# Patient Record
Sex: FEMALE | Race: WHITE
[De-identification: names, ages, dates, MRNs, and addresses within clinical notes are randomized per-mention and may not be internally consistent; named-entity substitution may affect disease eponyms.]

---

## 2018-02-13 NOTE — EDM.PDOC
ED HPI GENERAL MEDICAL PROBLEM





- General


Chief Complaint: General


Stated Complaint: PT BLOOD PRESSURE HIGH


Time Seen by Provider: 18 22:59





- History of Present Illness


INITIAL COMMENTS - FREE TEXT/NARRATIVE: 





HISTORY AND PHYSICAL:





History of present illness:


The patient is a 77-year-old female who follows in our family practice clinic 

and actually has a scheduled appointment tomorrow with Dr. Morrow for discussion 

about her blood pressure and presents tonight saying that it increased more 

significantly and she is concerned. The patient says she's been under a lot of 

stress this year as her  was very ill and recently  in December. She 

has noted over the last few months that her blood pressures been on the higher 

side and in fact had her lisinopril doubled to 20 mg once a day in December. 

She has a follow-up appointment scheduled tomorrow and she says over the last 

week or so it has been running on the higher side with systolics in the 160s. 

This evening she checked it and it was 188 which made her very nervous and 

anxious and then she took it again on the other side and it was elevated to the 

200s so she came for evaluation. The patient has been eating and drinking 

normally and has not had increased sodium in her diet. She has no swelling in 

her legs no chest pain no shortness of breath abdominal pain or vomiting and no 

urinary complaints. She is not lightheaded or dizzy and has no headaches. She 

has no weakness or neurosensory changes in her extremities. She admits that she 

has been very anxious since the death of her  and she is anxious about 

her blood pressure.





Review of systems: 


As per history of present illness and below otherwise all systems reviewed and 

negative.





Past medical history: 


As per history of present illness and as reviewed below otherwise 

noncontributory.





Surgical history: 


As per history of present illness and as reviewed below otherwise 

noncontributory.





Social history: 


No reported history of drug or alcohol abuse.





Family history: 


As per history of present illness and as reviewed below otherwise 

noncontributory.





Physical exam:


Gen.: Well-developed well-nourished male who is nontoxic and moves easily in 

the ED. Her blood pressure of 235/117 as noted by me. Clearly and easily


HEENT: Atraumatic, normocephalic, pupils reactive, negative for conjunctival 

pallor or scleral icterus, mucous membranes moist, throat clear, neck supple, 

nontender, trachea midline.


Lungs: Clear to auscultation, breath sounds equal bilaterally, chest nontender.


Heart: S1S2, regular, negative for clicks, rubs, or JVD.


Abdomen: Soft, nondistended, nontender. Negative for masses or 

hepatosplenomegaly. NABS.


Pelvis: Stable nontender.


Genitourinary: Deferred.


Rectal: Deferred.


Extremities: Atraumatic, negative for cords or calf pain. Neurovascular 

unremarkable. No pedal edema or leg asymmetry


Neuro: Awake, alert, oriented. Cranial nerves II through XII unremarkable. 

Cerebellum unremarkable. Motor and sensory unremarkable throughout. Exam 

nonfocal.





Diagnostics:


EKG chest x-ray CBC CMP troponin UA urine culture





Therapeutics:


Ativan by mouth, lisinopril 10 mg





Patient's current blood pressure at 0040 is 181/112. I will give the patient an 

extra dose of lisinopril and reevaluate.





0120: Patient's current blood pressure is 158/96 she is aware of all testing 

results including the early UTI. I will give her a prescription for antibiotics 

as she does not want to fill them via [facility]. I have told her to keep her 

appointment at 1045 morning with Dr. Diaz to discuss her blood pressure 

medication dosing and to inform him that she was here this evening and that he 

can look at her testing results and my note. She is comfortable with discharge 

home with follow-up in the morning.





Impression: 


Hypertension poorly controlled, stable improved, early UTI





Definitive disposition and diagnosis as appropriate pending reevaluation and 

review of above.





- Related Data


 Allergies











Allergy/AdvReac Type Severity Reaction Status Date / Time


 


No Known Allergies Allergy   Verified 18 23:09











Home Meds: 


 Home Meds





Ascorbic Acid [Vitamin C] 500 mg PO DAILY 18 [History]


Aspirin 81 mg PO DAILY 18 [History]


Calcium Carbonate [Calcium] 1,200 mg PO DAILY 18 [History]


Escitalopram Oxalate 10 mg PO DAILY 18 [History]


Fish Oil/Omega-3 Fatty Acids [Fish Oil 1,000 MG] 1 cap PO DAILY 18 [

History]


Lisinopril 20 mg PO DAILY 18 [History]


Magnesium 500 mg PO DAILY 18 [History]


Multivitamin [Multi-Vitamin Daily] 1 tab PO DAILY 18 [History]











ED ROS GENERAL





- Review of Systems


Review Of Systems: ROS reveals no pertinent complaints other than HPI.





ED EXAM, GENERAL





- Physical Exam


Exam: See Below (see dictation)





Course





- Vital Signs


Last Recorded V/S: 


 Last Vital Signs











Temp  36.3 C   18 22:41


 


Pulse  64   18 00:36


 


Resp  13   18 00:36


 


BP  183/104 H  18 00:46


 


Pulse Ox  100   18 00:36














- Orders/Labs/Meds


Orders: 


 Active Orders 24 hr











 Category Date Time Status


 


 EKG Documentation Completion [RC] STAT Care  18 23:05 Active


 


 Chest 1V Frontal [CR] Stat Exams  18 23:06 Taken


 


 CULTURE URINE [RM] Stat Lab  18 00:35 Received


 


 UA W/MICROSCOPIC [URIN] Stat Lab  18 01:19 Ordered











Labs: 


 Laboratory Tests











  18 Range/Units





  23:34 23:34 00:35 


 


WBC  6.05    (4.0-11.0)  K/uL


 


RBC  4.32    (4.30-5.90)  M/uL


 


Hgb  13.4    (12.0-16.0)  g/dL


 


Hct  40.2    (36.0-46.0)  %


 


MCV  93.1    (80.0-98.0)  fL


 


MCH  31.0    (27.0-32.0)  pg


 


MCHC  33.3    (31.0-37.0)  g/dL


 


RDW Std Deviation  47.1    (28.0-62.0)  fl


 


RDW Coeff of Kevin  14    (11.0-15.0)  %


 


Plt Count  207    (150-400)  K/uL


 


MPV  9.90    (7.40-12.00)  fL


 


Neut % (Auto)  54.4    (48.0-80.0)  %


 


Lymph % (Auto)  34.9    (16.0-40.0)  %


 


Mono % (Auto)  8.1    (0.0-15.0)  %


 


Eos % (Auto)  2.1    (0.0-7.0)  %


 


Baso % (Auto)  0.5    (0.0-1.5)  %


 


Neut # (Auto)  3.3    (1.4-5.7)  K/uL


 


Lymph # (Auto)  2.1    (0.6-2.4)  K/uL


 


Mono # (Auto)  0.5    (0.0-0.8)  K/uL


 


Eos # (Auto)  0.1    (0.0-0.7)  K/uL


 


Baso # (Auto)  0.0    (0.0-0.1)  K/uL


 


Nucleated RBC %  0.0    /100WBC


 


Nucleated RBCs #  0    K/uL


 


Sodium   142   (136-146)  mmol/L


 


Potassium   3.8   (3.5-5.1)  mmol/L


 


Chloride   104   ()  mmol/L


 


Carbon Dioxide   27   (21-31)  mmol/L


 


BUN   18   (6.0-23.0)  mg/dL


 


Creatinine   0.8   (0.6-1.5)  mg/dL


 


Est Cr Clr Drug Dosing   TNP   


 


Estimated GFR (MDRD)   > 60.0   ml/min


 


Glucose   118 H   ()  mg/dL


 


Calcium   10.5   (8.8-10.8)  mg/dL


 


Total Bilirubin   0.5   (0.1-1.5)  mg/dL


 


AST   29   (5-40)  IU/L


 


ALT   28   (8-54)  IU/L


 


Alkaline Phosphatase   46   ()  


 


Troponin I   < 0.10   (0.0-0.29)  NG/ML


 


Total Protein   7.3   (6.0-8.0)  g/dL


 


Albumin   4.5   (3.4-4.8)  g/dL


 


Globulin   2.8   (2.0-3.5)  g/dL


 


Albumin/Globulin Ratio   1.6   (1.3-2.8)  


 


Urine Color    YELLOW  


 


Urine Appearance    HAZY  


 


Urine pH    7.5  (5.0-8.0)  


 


Ur Specific Gravity    1.010  (1.001-1.035)  


 


Urine Protein    NEGATIVE  (NEGATIVE)  mg/dL


 


Urine Glucose (UA)    NEGATIVE  (NEGATIVE)  mg/dL


 


Urine Ketones    NEGATIVE  (NEGATIVE)  mg/dL


 


Urine Occult Blood    TRACE-LYSED  (NEGATIVE)  


 


Urine Nitrite    NEGATIVE  (NEGATIVE)  


 


Urine Bilirubin    NEGATIVE  (NEGATIVE)  


 


Urine Urobilinogen    0.2  (<2.0)  EU/dL


 


Ur Leukocyte Esterase    MODERATE  (NEGATIVE)  


 


Urine RBC    1-2  (0-2/HPF)  


 


Urine WBC    6-8  (0-5/HPF)  


 


Ur Epithelial Cells    FEW  (NONE-FEW)  


 


Urine Bacteria    FEW  (NEGATIVE)  











Meds: 


Medications














Discontinued Medications














Generic Name Dose Route Start Last Admin





  Trade Name Nic  PRN Reason Stop Dose Admin


 


Lisinopril  10 mg  18 00:40  18 00:46





  Prinivil  PO  18 00:41  10 mg





  ONETIME ONE   Administration


 


Lorazepam  0.5 mg  18 23:08  18 23:17





  Ativan  PO  18 23:09  0.5 mg





  ONETIME ONE   Administration














Departure





- Departure


Time of Disposition: 


Disposition: Home, Self-Care 01


Condition: Good


Clinical Impression: 


 Poorly-controlled hypertension





UTI (urinary tract infection)


Qualifiers:


 Urinary tract infection type: site unspecified Hematuria presence: without 

hematuria Qualified Code(s): N39.0 - Urinary tract infection, site not specified








- Discharge Information


Referrals: 


PCP,None [Primary Care Provider] - 


Forms:  ED Department Discharge


Additional Instructions: 


The following information is given to patients seen in the emergency department 

who are being discharged to home. This information is to outline your options 

for follow-up care. We provide all patients seen in our emergency department 

with a follow-up referral.





The need for follow-up, as well as the timing and circumstances, are variable 

depending upon the specifics of your emergency department visit.





If you don't have a primary care physician on staff, we will provide you with a 

referral. We always advise you to contact your personal physician following an 

emergency department visit to inform them of the circumstance of the visit and 

for follow-up with them and/or the need for any referrals to a consulting 

specialist.





The emergency department will also refer you to a specialist when appropriate. 

This referral assures that you have the opportunity for followup care with a 

specialist. All of these measure are taken in an effort to provide you with 

optimal care, which includes your followup.





Under all circumstances we always encourage you to contact your private 

physician who remains a resource for coordinating  your care. When calling for 

followup care, please make the office aware that this follow-up is from your 

recent emergency room visit. If for any reason you are refused follow-up, 

please contact the [facility] emergency 

department at [phone] and ask to speak to the emergency department 

charge nurse.





[facility] 


Primary care- Internal Medicine and Family [place] [id number] [place] [id number]


[phone]








Please keep your appointment this morning in the clinic with Dr. Morrow and 

inform him of tonight's visit so that he can review the labs and my notes. 

Please take your lisinopril in the morning as you normally do. He states her 

blood pressure before taking those meds. Return to ER as needed and as 

discussed.





- My Orders


Last 24 Hours: 


My Active Orders





18 23:05


EKG Documentation Completion [RC] STAT 





18 23:06


Chest 1V Frontal [CR] Stat 





18 00:35


CULTURE URINE [RM] Stat 





18 01:19


UA W/MICROSCOPIC [URIN] Stat 














- Assessment/Plan


Last 24 Hours: 


My Active Orders





18 23:05


EKG Documentation Completion [RC] STAT 





18 23:06


Chest 1V Frontal [CR] Stat 





18 00:35


CULTURE URINE [RM] Stat 





18 01:19


UA W/MICROSCOPIC [URIN] Stat

## 2018-02-14 NOTE — CR
EXAM DATE: 18



PATIENT'S AGE: 77





Patient: ALIYAH BALLARD



Facility: [place], [place]

Patient ID: [id number]

Site Patient ID: [id number].

Site Accession #: [id number]

: 1940

Study: XRay Chest [id number]/13/2018 11:36:34 PM

Ordering Physician: Doctor Temp



Final Report: 

INDICATION: Hypertension, shortness of breath



TECHNIQUE: Chest radiograph 2 views



COMPARISON: 14



FINDINGS: 



Mediastinum: The heart silhouette is normal in size and morphology. The 
mediastinum is normal in appearance. 



Lungs: Both lungs are unremarkable in appearance. Mild right apical pleural 
scarring noted. No sign of pleural effusion seen. No pneumothorax is 
identified. 



Bones and soft tissue: Unremarkable for age. 



IMPRESSION: 

1. No acute cardiopulmonary disease is seen. 



Dictated by: Wood Campbell MD @ 2018 23:38:00

(Electronic Signature)



Report Signed by Proxy.
MTDMARIA E

## 2019-04-09 NOTE — OR
SURGEON:

MARY HERRERA MD

 

DATE OF PROCEDURE:  04/09/2019

 

PREOPERATIVE DIAGNOSIS:

Change in bowel habits.

 

POSTOPERATIVE DIAGNOSIS:

Sigmoid colon polyps x2.

 

PROCEDURE PERFORMED:

Diagnostic colonoscopy.

 

ENDOSCOPIST:

Mary Herrera MD.

 

ANESTHESIA:

MAC.

 

INSTRUMENT USED:

Olympus colonoscope.

 

EXTENT OF EXAM:

To the cecum.

 

PREPARATION:

Good.

 

LIMITATIONS:

None.

 

INDICATIONS FOR EXAMINATION:

The patient is a 78-year-old female who presents with a change in her bowel

habits.  We discussed the need for diagnostic colonoscopy.  I explained the

procedure, expected perioperative course, and risks including bleeding,

infection, or damage to surrounding structures including perforation.  The

patient verbalized understanding and wishes to proceed.

 

PROCEDURE IN DETAIL:

The patient was brought into the endoscopy suite and placed in a left lateral

decubitus position.  A time-out was completed verifying the patient's name, age,

date of birth, allergies, and procedure to be performed.  Monitored anesthesia

care was induced and continuous oxygen was provided via nasal cannula throughout

the procedure.  After adequate sedation was achieved, a digital rectal exam was

performed.  The patient appeared to have a very lax sphincter muscle, but I did

not feel any other abnormalities.  A well lubricated colonoscope was inserted

into the rectum and advanced under direct visualization to the level of the

cecum.  The cecum was identified by both visual and anatomic landmarks.  A

photograph was taken of the cecal cap, however, I was unable to retroflex the

scope within the cecum due to looping of the scope more proximally.  The scope

was then straightened out and fully withdrawn while examining the color,

texture, anatomy, and integrity of the mucosa from the cecum to the anal canal.

The patient was found to have 2 very small sessile polyps in the distal sigmoid

colon.  These were removed using a cold biopsy forceps.  They were sent to

Pathology, labeled as sigmoid colon polyp #1 and sigmoid colon polyp #2.  The

scope was then brought into the rectum and retroflexed to allow visualization of

the anal canal opening.  This appeared grossly normal and a photograph was

taken.  The scope was then straightened out and fully withdrawn.  The cecum to

anus time was 11 minutes.  The patient tolerated the procedure well and was

taken to PACU in stable condition.

 

ENDOSCOPIC DIAGNOSIS:

Sigmoid colon polyps x2.

 

RECOMMENDATIONS:

We will follow up with the patient in clinic in 2 weeks to discuss both the

Pathology results of the polypectomy as well as the sphincter muscle laxity that

I discovered on exam today.

 

 

ÁLVARO / JANELLE

DD:  04/09/2019 15:41:37

DT:  04/09/2019 17:34:12

Job #:  [id number]

## 2019-04-09 NOTE — PCM.OPNOTE
- General Post-Op/Procedure Note


Date of Surgery/Procedure: 04/09/19


Operative Procedure(s): diagnostic colonoscopy


Findings: 





2 sigmoid colon polyps


Pre Op Diagnosis: change in bowel habits, diagnostic colonoscopy


Post-Op Diagnosis: 2 sigmoid colon polyps, lax anal sphincter


Anesthesia Technique: MAC


Primary Surgeon: Mary Herrera


Pathology: 


2 sigmoid colon polyps





EBL in mLs: 0


Condition: Good

## 2019-04-09 NOTE — PCM.PREANE
Preanesthetic Assessment





- Anesthesia/Transfusion/Family Hx


Anesthesia History: Prior Anesthesia Without Reaction


Family History of Anesthesia Reaction: No


Transfusion History: No Prior Transfusion(s)





- Review of Systems


General: No Symptoms


Pulmonary: No Symptoms


Cardiovascular: No Symptoms


Gastrointestinal: No Symptoms


Neurological: No Symptoms


Other: Reports: None





- Physical Assessment


NPO Status Date: 04/08/19


Height: 5 ft 7 in


Weight: 67.132 kg


ASA Class: 2


Mental Status: Alert & Oriented x3


Airway Class: Mallampati = 2


Dentition: Reports: Bridge


ROM/Head Extension: Full


Lungs: Clear to Auscultation, Normal Respiratory Effort


Cardiovascular: Regular Rate, Regular Rhythm





- Allergies


Allergies/Adverse Reactions: 


 Allergies











Allergy/AdvReac Type Severity Reaction Status Date / Time


 


atenolol Allergy  Fainting Verified 04/08/19 09:00


 


desvenlafaxine [From Pristiq] Allergy  Nausea Verified 04/08/19 09:00


 


lisinopril Allergy  Cough Verified 04/08/19 09:00














- Blood


Blood Available: No





- Anesthesia Plan


Pre-Op Medication Ordered: None





- Acknowledgements


Anesthesia Type Planned: General Anesthesia, MAC


Pt an Appropriate Candidate for the Planned Anesthesia: Yes


Alternatives and Risks of Anesthesia Discussed w Pt/Guardian: Yes


Pt/Guardian Understands and Agrees with Anesthesia Plan: Yes


Additional Comments: 





PMH: htn, hld, benign essential tremur


PLAN: mac/tiva





PreAnesthesia Questionnaire





- Past Health History


Medical/Surgical History: Denies Medical/Surgical History


HEENT History: Reports: Other (See Below)


Other HEENT History: wears glasses,  has upper and lower permanent partial 

dentures


Cardiovascular History: Reports: Hypertension


Gastrointestinal History: Reports: Chronic Constipation


Genitourinary History: Reports: None


OB/GYN History: Reports: Pregnancy


Neurological History: Reports: Other (See Below)


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


Psychiatric History: Reports: Anxiety





- Past Surgical History


GI Surgical History: Reports: Colonoscopy


Female  Surgical History: Reports: Hysterectomy





- SUBSTANCE USE


Smoking Status *Q: Never Smoker


Recreational Drug Use History: No





- HOME MEDS


Home Medications: 


 Home Meds





Ascorbic Acid [Vitamin C] 500 mg PO DAILY 02/13/18 [History]


Aspirin 81 mg PO DAILY 02/13/18 [History]


Fish Oil/Omega-3 Fatty Acids [Fish Oil 1,000 MG] 1,200 mg PO DAILY 02/13/18 [

History]


Magnesium 500 mg PO DAILY 02/13/18 [History]


Multivitamin [Multi-Vitamin Daily] 1 tab PO DAILY 02/13/18 [History]


Cholecalciferol (Vitamin D3) [Vitamin D3] 1,000 unit PO DAILY 04/08/19 [History]


Escitalopram Oxalate 20 mg PO BEDTIME 04/08/19 [History]


Niacin 500 mg PO DAILY 04/08/19 [History]


Olmesartan Medoxomil 40 mg PO BEDTIME 04/08/19 [History]


hydroCHLOROthiazide [Hydrochlorothiazide] 25 mg PO DAILY 04/08/19 [History]











- CURRENT (IN HOUSE) MEDS


Current Meds: 





 Current Medications





Lactated Ringer's (Ringers, Lactated)  1,000 mls @ 125 mls/hr IV ASDIRECTED KENDRICK


Sodium Chloride (Saline Flush)  10 ml FLUSH ASDIRECTED PRN


   PRN Reason: Keep Vein Open


Sodium Chloride (Saline Flush)  2.5 ml FLUSH ASDIRECTED PRN


   PRN Reason: Keep Vein Open


Sodium Chloride (Saline Flush)  10 ml FLUSH ASDIRECTED PRN


   PRN Reason: Keep Vein Open


Sodium Chloride (Saline Flush)  2.5 ml FLUSH ASDIRECTED PRN


   PRN Reason: Keep Vein Open


Sodium Chloride (Normal Saline)  10 ml IV ASDIRECTED PRN


   PRN Reason: IV Use





Discontinued Medications





Fentanyl (Sublimaze) Confirm Administered Dose 100 mcg .ROUTE .STK-MED ONE


   Stop: 04/09/19 07:49


Lidocaine HCl (Xylocaine-Mpf 1%) Confirm Administered Dose 5 mls @ as directed 

.ROUTE .STK-MED ONE


   Stop: 04/09/19 07:49


Midazolam HCl (Versed 1 Mg/Ml) Confirm Administered Dose 2 mg .ROUTE .STK-MED 

ONE


   Stop: 04/09/19 07:49


Propofol (Diprivan  20 Ml) Confirm Administered Dose 200 mg .ROUTE .STK-MED ONE


   Stop: 04/09/19 07:49

## 2021-03-24 ENCOUNTER — HOSPITAL ENCOUNTER (EMERGENCY)
Dept: HOSPITAL 56 - MW.ED | Age: 81
LOS: 1 days | Discharge: HOME | End: 2021-03-25
Payer: MEDICARE

## 2021-03-24 DIAGNOSIS — Z88.8: ICD-10-CM

## 2021-03-24 DIAGNOSIS — Z79.899: ICD-10-CM

## 2021-03-24 DIAGNOSIS — Z79.82: ICD-10-CM

## 2021-03-24 DIAGNOSIS — I10: Primary | ICD-10-CM

## 2021-03-24 LAB
BUN SERPL-MCNC: 27 MG/DL (ref 7–18)
CHLORIDE SERPL-SCNC: 102 MMOL/L (ref 98–107)
CO2 SERPL-SCNC: 27.5 MMOL/L (ref 21–32)
GLUCOSE SERPL-MCNC: 104 MG/DL (ref 74–106)
POTASSIUM SERPL-SCNC: 3.9 MMOL/L (ref 3.5–5.1)
SODIUM SERPL-SCNC: 138 MMOL/L (ref 136–145)

## 2021-03-24 PROCEDURE — 36415 COLL VENOUS BLD VENIPUNCTURE: CPT

## 2021-03-24 PROCEDURE — 85025 COMPLETE CBC W/AUTO DIFF WBC: CPT

## 2021-03-24 PROCEDURE — 84484 ASSAY OF TROPONIN QUANT: CPT

## 2021-03-24 PROCEDURE — 93005 ELECTROCARDIOGRAM TRACING: CPT

## 2021-03-24 PROCEDURE — 99283 EMERGENCY DEPT VISIT LOW MDM: CPT

## 2021-03-24 PROCEDURE — 80053 COMPREHEN METABOLIC PANEL: CPT

## 2021-03-24 NOTE — EDM.PDOC
ED HPI GENERAL MEDICAL PROBLEM





- General


Chief Complaint: Cardiovascular Problem


Stated Complaint: HIGH BP


Time Seen by Provider: 03/24/21 22:50





- History of Present Illness


INITIAL COMMENTS - FREE TEXT/NARRATIVE: 





HISTORY AND PHYSICAL:





History of present illness:


This is a 80-year-old female with a history significant for hypertension who 

presents ER today secondary to elevated blood pressure noted this evening.  

Patient reports that she had a mild headache prior to checking her blood 

pressure.  Patient denies any fevers, shakes, chills, nausea, vomiting, 

diarrhea, dysuria, frequency, urgency, chest pain, shortness of breath, 

abdominal pain.  Patient reports that she has been compliant with her 

antihypertensive medication.  Patient reports that she is on olmesartan Medox 40

mg daily.  Patient has an appointment tomorrow morning at 9 with her primary 

care physician.





Review of systems: 


As per history of present illness and below otherwise all systems reviewed and 

negative.





Past medical history: 


As per history of present illness and as reviewed below otherwise 

noncontributory.





Surgical history: 


As per history of present illness and as reviewed below otherwise 

noncontributory.





Social history: 


No reported history of drug or alcohol abuse.





Family history: 


As per history of present illness and as reviewed below otherwise 

noncontributory.





Physical exam:





This patient was seen and evaluated during the 2020 SARS-CoV-2 novel coronavirus

pandemic period.  Community viral transmission is ongoing at time of this 

encounter and the emergency department is operating under pandemic response 

procedures.





Constitutional: Patient is oriented to person, place, and time.  Appears well-

developed and well-nourished.  No distress.


 HEENT: Moist mucous membranes


 Head: Normocephalic and atraumatic


 Eyes: Right eye exhibits no discharge.  Left eye exhibits no discharge.  No 

scleral icterus


 Neck: Normal range of motion.  No tracheal deviation present.


 Cardiovascular: Normal rate and regular rhythm.


 Pulmonary: Effort normal, no respiratory distress.


 Abdominal: No distention


 Musculoskeletal: Normal range of motion


 Neurologic: Alert and oriented to person, place and time.


 Skin: Pink, warm and dry.  


 Psychiatric: Normal mood and affect.  Behavior is normal.  Judgment and thought

content normal.


 Nursing note and vital signs have been reviewed











Diagnostics:


CBC, CMP, troponin, EKG





EKG:


As interpreted by ER physician: Bhavik:


Nonspecific ST-T wave abnormalities


Normal axis


No evidence of ST elevation MI


Normal sinus rhythm heart rate of 68





Therapeutics:


[Clonidine 0.2 mg p.o. x1





Assessment and plan:


This is a 80-year-old female who presents ER today secondary to elevated blood 

pressure.  Patient does show me her blood pressure readings at home and she has 

been as low as 117/80 within the last couple weeks.  Patient's blood pressure 

also has been as high as 160/70.  This evening upon arrival to the ED the 

patient was noted to have a blood pressure of 213/109.  Patient does have an 

appointment tomorrow with her primary care physician who can assist with 

adjusting her medications.  Patient will be given clonidine 0.2 mg p.o. here in 

the ED, will check her CBC, CMP, troponin.  Patient's EKG is nondiagnostic 

without any evidence of acute ischemic changes.





Patient's blood pressure is significantly improved on the ED.  Patient's labs 

are all within normal limits.  Patient feels much improved and is requesting to 

be discharged home.  Patient has an appointment already scheduled in the morning

 with her primary care physician.  I have written down for the patient the 

medications that she has received in order to give to her family physician 

tomorrow.





Reassessment at the time of disposition demonstrates that the patient is in no 

acute distress.  The patient has remained stable throughout the entire ED visit 

and is without objective evidence for acute process requiring urgent 

intervention or hospitalization. The patient is stable for discharge, counseling

 is provided as documented above, discussed symptomatic treatment and specific 

conditions for return.





I have spoken with the patient/caregiver and discussed todays findings, in 

addition to providing specific details for the plan of care. Questions are 

answered and there is agreement with the plan.





Definitive disposition and diagnosis as appropriate pending reevaluation and 

review of above.











- Related Data


                                    Allergies











Allergy/AdvReac Type Severity Reaction Status Date / Time


 


atenolol Allergy  Fainting Verified 03/24/21 22:47


 


desvenlafaxine [From Pristiq] Allergy  Nausea Verified 03/24/21 22:47


 


lisinopril Allergy  Cough Verified 03/24/21 22:47











Home Meds: 


                                    Home Meds





Ascorbic Acid [Vitamin C] 500 mg PO DAILY 02/13/18 [History]


Aspirin 81 mg PO DAILY 02/13/18 [History]


Fish Oil/Omega-3 Fatty Acids [Fish Oil 1,000 MG] 1,200 mg PO DAILY 02/13/18 

[History]


Magnesium 500 mg PO DAILY 02/13/18 [History]


Multivitamin [Multi-Vitamin Daily] 1 tab PO DAILY 02/13/18 [History]


Cholecalciferol (Vitamin D3) [Vitamin D3] 1,000 unit PO DAILY 04/08/19 [History]


Escitalopram Oxalate 20 mg PO BEDTIME 04/08/19 [History]


Niacin 500 mg PO DAILY 04/08/19 [History]


Olmesartan Medoxomil 40 mg PO BEDTIME 04/08/19 [History]


Potassium Chloride 10 meq PO DAILY 03/24/21 [History]











Past Medical History





- Past Health History


Medical/Surgical History: Denies Medical/Surgical History


HEENT History: Reports: Other (See Below)


Other HEENT History: wears glasses,  has upper and lower permanent partial 

dentures


Cardiovascular History: Reports: Hypertension


Gastrointestinal History: Reports: Chronic Constipation


Genitourinary History: Reports: None


OB/GYN History: Reports: Pregnancy


Neurological History: Reports: Other (See Below)


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


Psychiatric History: Reports: Anxiety





- Past Surgical History


GI Surgical History: Reports: Colonoscopy


Female  Surgical History: Reports: Hysterectomy





Social & Family History





- Family History


Family Medical History: No Pertinent Family History





ED ROS GENERAL





- Review of Systems


Review Of Systems: See Below





ED EXAM, GENERAL





- Physical Exam


Exam: See Below





Course





- Vital Signs


Last Recorded V/S: 


                                Last Vital Signs











Temp  97.7 F   03/25/21 00:20


 


Pulse  64   03/25/21 00:20


 


Resp  18   03/25/21 00:20


 


BP  132/84   03/25/21 00:20


 


Pulse Ox  97   03/25/21 00:20














- Orders/Labs/Meds


Labs: 


                                Laboratory Tests











  03/24/21 03/24/21 Range/Units





  23:14 23:14 


 


WBC  6.63   (4.0-11.0)  K/uL


 


RBC  4.18 L   (4.30-5.90)  M/uL


 


Hgb  13.1   (12.0-16.0)  g/dL


 


Hct  39.3   (36.0-46.0)  %


 


MCV  94.0   (80.0-98.0)  fL


 


MCH  31.3   (27.0-32.0)  pg


 


MCHC  33.3   (31.0-37.0)  g/dL


 


RDW Std Deviation  46.8   (28.0-62.0)  fl


 


RDW Coeff of Kevin  14   (11.0-15.0)  %


 


Plt Count  225   (150-400)  K/uL


 


MPV  9.70   (7.40-12.00)  fL


 


Neut % (Auto)  55.4   (48.0-80.0)  %


 


Lymph % (Auto)  35.1   (16.0-40.0)  %


 


Mono % (Auto)  7.2   (0.0-15.0)  %


 


Eos % (Auto)  1.8   (0.0-7.0)  %


 


Baso % (Auto)  0.5   (0.0-1.5)  %


 


Neut # (Auto)  3.7   (1.4-5.7)  K/uL


 


Lymph # (Auto)  2.3   (0.6-2.4)  K/uL


 


Mono # (Auto)  0.5   (0.0-0.8)  K/uL


 


Eos # (Auto)  0.1   (0.0-0.7)  K/uL


 


Baso # (Auto)  0.0   (0.0-0.1)  K/uL


 


Nucleated RBC %  0.0   /100WBC


 


Nucleated RBCs #  0   K/uL


 


Sodium   138  (136-145)  mmol/L


 


Potassium   3.9  (3.5-5.1)  mmol/L


 


Chloride   102  ()  mmol/L


 


Carbon Dioxide   27.5  (21.0-32.0)  mmol/L


 


BUN   27 H  (7.0-18.0)  mg/dL


 


Creatinine   1.1 H  (0.6-1.0)  mg/dL


 


Est Cr Clr Drug Dosing   40.33  mL/min


 


Estimated GFR (MDRD)   47.9  ml/min


 


Glucose   104  ()  mg/dL


 


Calcium   9.5  (8.5-10.1)  mg/dL


 


Total Bilirubin   0.4  (0.2-1.0)  mg/dL


 


AST   23  (15-37)  IU/L


 


ALT   30  (14-63)  IU/L


 


Alkaline Phosphatase   49  ()  U/L


 


Troponin I   < 0.050  (0.000-0.056)  ng/mL


 


Total Protein   7.4  (6.4-8.2)  g/dL


 


Albumin   3.9  (3.4-5.0)  g/dL


 


Globulin   3.5  (2.6-4.0)  g/dL


 


Albumin/Globulin Ratio   1.1  (0.9-1.6)  











Meds: 


Medications














Discontinued Medications














Generic Name Dose Route Start Last Admin





  Trade Name Nic  PRN Reason Stop Dose Admin


 


Clonidine HCl  0.2 mg  03/24/21 23:05  03/24/21 23:10





  Clonidine 0.1 Mg Tab  PO  03/24/21 23:06  0.2 mg





  ONETIME ONE   Administration














Departure





- Departure


Time of Disposition: 00:11


Disposition: Home, Self-Care 01


Condition: Good


Clinical Impression: 


Hypertension


Qualifiers:


 Hypertension type: unspecified Qualified Code(s): I10 - Essential (primary) 

hypertension





Instructions:  Hypertension, Adult, Easy-to-Read


Referrals: 


Nicola Morrow MD [Primary Care Provider] - 


Forms:  ED Department Discharge


Additional Instructions: 


You were seen and evaluated in the ER today secondary to your markedly elevated 

blood pressure.  You were given clonidine 0.2 mg p.o. in the ED with significant

improvement in your blood pressure.  Please keep your appointment tomorrow 

morning with your family doctor so that they can assist you with managing your 

elevated blood pressure.  Please return to the ER for any new or concerning 

symptoms.





The following information is given to patients seen in the emergency department 

who are being discharged to home. This information is to outline your options 

for follow-up care. We provide all patients seen in our emergency department 

with a follow-up referral.





The need for follow-up, as well as the timing and circumstances, are variable 

depending upon the specifics of your emergency department visit.





If you don't have a primary care physician on staff, we will provide you with a 

referral. We always advise you to contact your personal physician following an 

emergency department visit to inform them of the circumstance of the visit and 

for follow-up with them and/or the need for any referrals to a consulting 

specialist.





The emergency department will also refer you to a specialist when appropriate. 

This referral assures that you have the opportunity for follow-up care with a 

specialist. All of these measure are taken in an effort to provide you with 

optimal care, which includes your follow-up.





Under all circumstances we always encourage you to contact your private 

physician who remains a resource for coordinating your care. When calling for 

follow-up care, please make the office aware that this follow-up is from your 

recent emergency room visit. If for any reason you are refused follow-up, please

contact the [facility] Emergency Department

at [phone] and asked to speak to the emergency department charge nurse.





[facility]


[address] [place] [id number]


Phone: [phone]


Fax: [phone]








[facility]


[address] [place] [id number]


Phone: [phone]


Fax: [phone]








Sepsis Event Note (ED)





- Evaluation


Sepsis Screening Result: No Definite Risk





- Focused Exam


Vital Signs: 


                                   Vital Signs











  Temp Pulse Resp BP BP Pulse Ox


 


 03/25/21 00:20  97.7 F  64  18   132/84  97


 


 03/24/21 23:52      157/95 H 


 


 03/24/21 23:10     190/117 H  


 


 03/24/21 22:48  98.4 F  68  16   213/109 H  95